# Patient Record
Sex: FEMALE | Race: WHITE | NOT HISPANIC OR LATINO
[De-identification: names, ages, dates, MRNs, and addresses within clinical notes are randomized per-mention and may not be internally consistent; named-entity substitution may affect disease eponyms.]

---

## 2018-02-06 ENCOUNTER — APPOINTMENT (OUTPATIENT)
Dept: FAMILY MEDICINE | Facility: CLINIC | Age: 58
End: 2018-02-06
Payer: COMMERCIAL

## 2018-02-06 VITALS
HEIGHT: 64.5 IN | DIASTOLIC BLOOD PRESSURE: 60 MMHG | WEIGHT: 138 LBS | BODY MASS INDEX: 23.27 KG/M2 | OXYGEN SATURATION: 96 % | SYSTOLIC BLOOD PRESSURE: 98 MMHG | TEMPERATURE: 98.2 F | HEART RATE: 67 BPM

## 2018-02-06 DIAGNOSIS — J00 ACUTE NASOPHARYNGITIS [COMMON COLD]: ICD-10-CM

## 2018-02-06 PROCEDURE — 99214 OFFICE O/P EST MOD 30 MIN: CPT

## 2018-05-08 ENCOUNTER — APPOINTMENT (OUTPATIENT)
Dept: FAMILY MEDICINE | Facility: CLINIC | Age: 58
End: 2018-05-08
Payer: COMMERCIAL

## 2018-05-08 VITALS
HEIGHT: 64.5 IN | HEART RATE: 70 BPM | SYSTOLIC BLOOD PRESSURE: 94 MMHG | DIASTOLIC BLOOD PRESSURE: 62 MMHG | TEMPERATURE: 97.8 F | OXYGEN SATURATION: 97 % | WEIGHT: 134 LBS | BODY MASS INDEX: 22.6 KG/M2

## 2018-05-08 DIAGNOSIS — Z87.09 PERSONAL HISTORY OF OTHER DISEASES OF THE RESPIRATORY SYSTEM: ICD-10-CM

## 2018-05-08 DIAGNOSIS — J06.9 ACUTE UPPER RESPIRATORY INFECTION, UNSPECIFIED: ICD-10-CM

## 2018-05-08 PROCEDURE — 99214 OFFICE O/P EST MOD 30 MIN: CPT

## 2018-05-08 RX ORDER — AZITHROMYCIN 250 MG/1
250 TABLET, FILM COATED ORAL
Qty: 6 | Refills: 0 | Status: COMPLETED | COMMUNITY
Start: 2018-02-06 | End: 2018-02-11

## 2018-07-16 ENCOUNTER — RX RENEWAL (OUTPATIENT)
Age: 58
End: 2018-07-16

## 2019-03-14 ENCOUNTER — CLINICAL ADVICE (OUTPATIENT)
Age: 59
End: 2019-03-14

## 2019-08-06 ENCOUNTER — APPOINTMENT (OUTPATIENT)
Dept: FAMILY MEDICINE | Facility: CLINIC | Age: 59
End: 2019-08-06
Payer: COMMERCIAL

## 2019-08-06 VITALS
OXYGEN SATURATION: 98 % | WEIGHT: 139 LBS | BODY MASS INDEX: 23.16 KG/M2 | DIASTOLIC BLOOD PRESSURE: 82 MMHG | TEMPERATURE: 98.3 F | HEART RATE: 61 BPM | HEIGHT: 65 IN | SYSTOLIC BLOOD PRESSURE: 122 MMHG

## 2019-08-06 DIAGNOSIS — D12.6 BENIGN NEOPLASM OF COLON, UNSPECIFIED: ICD-10-CM

## 2019-08-06 DIAGNOSIS — R63.5 ABNORMAL WEIGHT GAIN: ICD-10-CM

## 2019-08-06 DIAGNOSIS — R35.0 FREQUENCY OF MICTURITION: ICD-10-CM

## 2019-08-06 DIAGNOSIS — R15.9 FULL INCONTINENCE OF FECES: ICD-10-CM

## 2019-08-06 PROCEDURE — G0444 DEPRESSION SCREEN ANNUAL: CPT

## 2019-08-06 PROCEDURE — 99214 OFFICE O/P EST MOD 30 MIN: CPT | Mod: 25

## 2019-08-06 RX ORDER — AZITHROMYCIN DIHYDRATE 250 MG/1
250 TABLET, FILM COATED ORAL
Qty: 1 | Refills: 0 | Status: COMPLETED | COMMUNITY
Start: 2018-05-08 | End: 2019-08-06

## 2019-08-06 NOTE — HEALTH RISK ASSESSMENT
[] : No [1 or 2 (0 pts)] : 1 or 2 (0 points) [Never (0 pts)] : Never (0 points) [No] : In the past 12 months have you used drugs other than those required for medical reasons? No [No falls in past year] : Patient reported no falls in the past year [0] : 2) Feeling down, depressed, or hopeless: Not at all (0) [Audit-CScore] : 0 [de-identified] : walking  [de-identified] : healthy  [[id number]] : 0

## 2019-08-06 NOTE — PHYSICAL EXAM
[No Varicosities] : no varicosities [Normal] : normal rate, regular rhythm, normal S1 and S2 and no murmur heard [No Edema] : there was no peripheral edema [Non Tender] : non-tender [Soft] : abdomen soft [Non-distended] : non-distended [No HSM] : no HSM [Normal Bowel Sounds] : normal bowel sounds [Normal Sphincter Tone] : normal sphincter tone [No Mass] : no mass [Normal Supraclavicular Nodes] : no supraclavicular lymphadenopathy [Normal Axillary Nodes] : no axillary lymphadenopathy [Normal Posterior Cervical Nodes] : no posterior cervical lymphadenopathy [Normal Anterior Cervical Nodes] : no anterior cervical lymphadenopathy [Normal Inguinal Nodes] : no inguinal lymphadenopathy [FreeTextEntry1] : brawn stool +

## 2019-08-06 NOTE — HISTORY OF PRESENT ILLNESS
[FreeTextEntry8] : cc: stool problem , weight gain \par Patient came c/o problem with her stool - sometimes when she urinates she find small amount of stool ( regural ) , no all the time , but recently more often, last Colonoscopy  2016 Diverticulosis. . She did not see GYN, due to Dexa. Needs fating blood work, She deneis Abd pain, no N,V,C,,no D , no CP, no fever.

## 2019-08-06 NOTE — PHYSICAL EXAM
[Normal] : normal rate, regular rhythm, normal S1 and S2 and no murmur heard [No Varicosities] : no varicosities [No Edema] : there was no peripheral edema [Soft] : abdomen soft [Non Tender] : non-tender [Non-distended] : non-distended [No HSM] : no HSM [Normal Bowel Sounds] : normal bowel sounds [Normal Sphincter Tone] : normal sphincter tone [No Mass] : no mass [Normal Supraclavicular Nodes] : no supraclavicular lymphadenopathy [Normal Axillary Nodes] : no axillary lymphadenopathy [Normal Posterior Cervical Nodes] : no posterior cervical lymphadenopathy [Normal Anterior Cervical Nodes] : no anterior cervical lymphadenopathy [Normal Inguinal Nodes] : no inguinal lymphadenopathy [FreeTextEntry1] : brawn stool +

## 2019-08-06 NOTE — COUNSELING
[Good understanding] : Patient has a good understanding of lifestyle changes and steps needed to achieve self management goal [Fall prevention counseling provided] : Fall prevention counseling provided

## 2019-08-06 NOTE — HEALTH RISK ASSESSMENT
[] : No [1 or 2 (0 pts)] : 1 or 2 (0 points) [Never (0 pts)] : Never (0 points) [No] : In the past 12 months have you used drugs other than those required for medical reasons? No [No falls in past year] : Patient reported no falls in the past year [0] : 2) Feeling down, depressed, or hopeless: Not at all (0) [Audit-CScore] : 0 [de-identified] : healthy  [de-identified] : walking  [[id number]] : 0

## 2019-08-06 NOTE — REVIEW OF SYSTEMS
[Abdominal Pain] : no abdominal pain [Nausea] : no nausea [Constipation] : no constipation [Diarrhea] : diarrhea [Vomiting] : no vomiting [Heartburn] : no heartburn [Melena] : no melena [Dysuria] : no dysuria [Incontinence] : incontinence [Nocturia] : no nocturia [Hematuria] : no hematuria [Frequency] : frequency [Negative] : Respiratory [FreeTextEntry7] : defecation during urination PRn

## 2019-08-08 LAB
25(OH)D3 SERPL-MCNC: 24.6 NG/ML
ALBUMIN SERPL ELPH-MCNC: 4.4 G/DL
ALP BLD-CCNC: 78 U/L
ALT SERPL-CCNC: 18 U/L
ANION GAP SERPL CALC-SCNC: 14 MMOL/L
APPEARANCE: CLEAR
AST SERPL-CCNC: 17 U/L
BACTERIA UR CULT: NORMAL
BASOPHILS # BLD AUTO: 0.03 K/UL
BASOPHILS NFR BLD AUTO: 0.6 %
BILIRUB SERPL-MCNC: 0.3 MG/DL
BILIRUBIN URINE: NEGATIVE
BLOOD URINE: NEGATIVE
BUN SERPL-MCNC: 19 MG/DL
CALCIUM SERPL-MCNC: 9.9 MG/DL
CHLORIDE SERPL-SCNC: 104 MMOL/L
CHOLEST SERPL-MCNC: 207 MG/DL
CHOLEST/HDLC SERPL: 3 RATIO
CO2 SERPL-SCNC: 24 MMOL/L
COLOR: NORMAL
CREAT SERPL-MCNC: 0.65 MG/DL
EOSINOPHIL # BLD AUTO: 0.06 K/UL
EOSINOPHIL NFR BLD AUTO: 1.1 %
ESTIMATED AVERAGE GLUCOSE: 105 MG/DL
FOLATE SERPL-MCNC: >20 NG/ML
GLUCOSE QUALITATIVE U: NEGATIVE
GLUCOSE SERPL-MCNC: 102 MG/DL
HAV IGM SER QL: NONREACTIVE
HBA1C MFR BLD HPLC: 5.3 %
HBV CORE IGM SER QL: NONREACTIVE
HBV SURFACE AG SER QL: NONREACTIVE
HCT VFR BLD CALC: 42.9 %
HCV AB SER QL: NONREACTIVE
HCV S/CO RATIO: 0.22 S/CO
HDLC SERPL-MCNC: 70 MG/DL
HGB BLD-MCNC: 13.9 G/DL
HIV1+2 AB SPEC QL IA.RAPID: NONREACTIVE
IMM GRANULOCYTES NFR BLD AUTO: 0.6 %
KETONES URINE: NEGATIVE
LDLC SERPL CALC-MCNC: 124 MG/DL
LEUKOCYTE ESTERASE URINE: NEGATIVE
LYMPHOCYTES # BLD AUTO: 1.38 K/UL
LYMPHOCYTES NFR BLD AUTO: 25.8 %
MAN DIFF?: NORMAL
MCHC RBC-ENTMCNC: 31.9 PG
MCHC RBC-ENTMCNC: 32.4 GM/DL
MCV RBC AUTO: 98.4 FL
MONOCYTES # BLD AUTO: 0.35 K/UL
MONOCYTES NFR BLD AUTO: 6.5 %
NEUTROPHILS # BLD AUTO: 3.5 K/UL
NEUTROPHILS NFR BLD AUTO: 65.4 %
NITRITE URINE: NEGATIVE
PH URINE: 7
PLATELET # BLD AUTO: 242 K/UL
POTASSIUM SERPL-SCNC: 4.5 MMOL/L
PROT SERPL-MCNC: 7 G/DL
PROTEIN URINE: NEGATIVE
RBC # BLD: 4.36 M/UL
RBC # FLD: 11.9 %
SODIUM SERPL-SCNC: 142 MMOL/L
SPECIFIC GRAVITY URINE: 1.01
TRIGL SERPL-MCNC: 64 MG/DL
TSH SERPL-ACNC: 1.6 UIU/ML
URATE SERPL-MCNC: 5.2 MG/DL
UROBILINOGEN URINE: NORMAL
VIT B12 SERPL-MCNC: 719 PG/ML
WBC # FLD AUTO: 5.35 K/UL

## 2019-09-19 ENCOUNTER — APPOINTMENT (OUTPATIENT)
Dept: OBGYN | Facility: CLINIC | Age: 59
End: 2019-09-19
Payer: COMMERCIAL

## 2019-09-19 VITALS
WEIGHT: 140 LBS | DIASTOLIC BLOOD PRESSURE: 74 MMHG | HEIGHT: 65 IN | SYSTOLIC BLOOD PRESSURE: 101 MMHG | BODY MASS INDEX: 23.32 KG/M2

## 2019-09-19 DIAGNOSIS — Z13.820 ENCOUNTER FOR SCREENING FOR OSTEOPOROSIS: ICD-10-CM

## 2019-09-19 DIAGNOSIS — R92.2 INCONCLUSIVE MAMMOGRAM: ICD-10-CM

## 2019-09-19 DIAGNOSIS — M62.89 OTHER SPECIFIED DISORDERS OF MUSCLE: ICD-10-CM

## 2019-09-19 DIAGNOSIS — Z01.419 ENCOUNTER FOR GYNECOLOGICAL EXAMINATION (GENERAL) (ROUTINE) W/OUT ABNORMAL FINDINGS: ICD-10-CM

## 2019-09-19 DIAGNOSIS — Z12.39 ENCOUNTER FOR OTHER SCREENING FOR MALIGNANT NEOPLASM OF BREAST: ICD-10-CM

## 2019-09-19 PROCEDURE — 99386 PREV VISIT NEW AGE 40-64: CPT

## 2019-09-19 NOTE — COUNSELING
[Breast Self Exam] : breast self exam [Nutrition] : nutrition [Exercise] : exercise [Vitamins/Supplements] : vitamins/supplements [Bladder Hygiene] : bladder hygiene [Vulvar Hygiene] : vulvar hygiene [Body Image] : body image

## 2019-09-19 NOTE — CHIEF COMPLAINT
[Annual Visit] : annual visit [FreeTextEntry1] : mammo 2018,colon 2016\par dysp ,vag dryness--rec [facility] and PT--pt frightened to do pap\par [facility]

## 2019-09-19 NOTE — PHYSICAL EXAM
[Awake] : awake [Alert] : alert [Soft] : soft [Oriented x3] : oriented to person, place, and time [Normal] : uterus [No Bleeding] : there was no active vaginal bleeding [Uterine Adnexae] : were not tender and not enlarged [Acute Distress] : no acute distress [Mass] : no breast mass [Nipple Discharge] : no nipple discharge [Axillary LAD] : no axillary lymphadenopathy [Tender] : non tender [Atrophy] : atrophy [FreeTextEntry4] : levator muscle spasm

## 2019-09-20 LAB — HPV HIGH+LOW RISK DNA PNL CVX: NOT DETECTED

## 2019-09-24 LAB — CYTOLOGY CVX/VAG DOC THIN PREP: ABNORMAL

## 2019-10-02 ENCOUNTER — APPOINTMENT (OUTPATIENT)
Dept: GASTROENTEROLOGY | Facility: CLINIC | Age: 59
End: 2019-10-02
Payer: COMMERCIAL

## 2019-10-02 VITALS
WEIGHT: 142 LBS | OXYGEN SATURATION: 98 % | HEIGHT: 65 IN | RESPIRATION RATE: 15 BRPM | HEART RATE: 67 BPM | DIASTOLIC BLOOD PRESSURE: 68 MMHG | BODY MASS INDEX: 23.66 KG/M2 | SYSTOLIC BLOOD PRESSURE: 118 MMHG

## 2019-10-02 DIAGNOSIS — K52.9 NONINFECTIVE GASTROENTERITIS AND COLITIS, UNSPECIFIED: ICD-10-CM

## 2019-10-02 PROCEDURE — 99203 OFFICE O/P NEW LOW 30 MIN: CPT

## 2019-10-02 NOTE — PHYSICAL EXAM
[General Appearance - Alert] : alert [General Appearance - In No Acute Distress] : in no acute distress [Outer Ear] : the ears and nose were normal in appearance [Sclera] : the sclera and conjunctiva were normal [Auscultation Breath Sounds / Voice Sounds] : lungs were clear to auscultation bilaterally [Heart Rate And Rhythm] : heart rate was normal and rhythm regular [Heart Sounds] : normal S1 and S2 [Heart Sounds Gallop] : no gallops [Murmurs] : no murmurs [Heart Sounds Pericardial Friction Rub] : no pericardial rub [Edema] : there was no peripheral edema [Bowel Sounds] : normal bowel sounds [Abdomen Soft] : soft [Abdomen Tenderness] : non-tender [] : no hepato-splenomegaly [Abdomen Mass (___ Cm)] : no abdominal mass palpated [Normal Sphincter Tone] : normal sphincter tone [No Rectal Mass] : no rectal mass [External Hemorrhoid] : external hemorrhoids [FreeTextEntry1] : small tinge of yellow stool in vault [Skin Color & Pigmentation] : normal skin color and pigmentation [No Focal Deficits] : no focal deficits

## 2019-10-02 NOTE — HISTORY OF PRESENT ILLNESS
[FreeTextEntry1] : This is a 59-year-old female who is a living liver donator to her  presenting with symptoms of having bowel movements with urination. She states that for the past few months she has had more frequent urination and at times with urinary incontinence. She has noticed that she has a small amount of bowel movement with each urination. She does have some urge to have bowel movement just prior to having a bowel movement. The stool is small, soft and formed. She denies associated rectal bleeding or melena. She denies fecal incontinence. She denies weight loss or history of anemia. She denies family history of colon cancer. She underwent a full colonoscopy 2016 with diverticulosis and a long redundant tortuous colon. I explained this to her at length. She also complains of back pain. She reports history of chronic back pain with sciatica. She admits to drinking 20 ounce coffee in the morning with milk. She is reluctant to give this up.  She is on a special diet to lose weight. Part of the diet is using milk to make ice cream. She also has cheese products and yogurt.

## 2019-10-02 NOTE — ASSESSMENT
[FreeTextEntry1] : This is a 59-year-old female presenting with bowel movements with urination. She denies fecal incontinence. She does report more frequent urination and a urinary incontinence. She may have pelvic floor dysfunction. I recommend consultation with urogynecology. I gave her contact information. I recommend 2 week trial on a lactose-free diet. I recommend cutting down on the intake of caffeinated beverages including having smaller amount of coffee in the morning with lactose-free milk. If she continues to have GI symptoms despite above measures, she is to give me a call then consideration be given to repeating a colonoscopy. She is concerned that the back pain is a sign of colorectal cancer. However she does have a history of chronic back pain with sciatica.

## 2019-12-02 ENCOUNTER — OTHER (OUTPATIENT)
Age: 59
End: 2019-12-02

## 2019-12-03 ENCOUNTER — OTHER (OUTPATIENT)
Age: 59
End: 2019-12-03

## 2020-12-16 PROBLEM — J06.9 URI, ACUTE: Status: RESOLVED | Noted: 2018-05-08 | Resolved: 2020-12-16

## 2020-12-21 ENCOUNTER — APPOINTMENT (OUTPATIENT)
Dept: GASTROENTEROLOGY | Facility: CLINIC | Age: 60
End: 2020-12-21
Payer: COMMERCIAL

## 2020-12-21 VITALS
TEMPERATURE: 98.9 F | HEART RATE: 70 BPM | BODY MASS INDEX: 23.32 KG/M2 | DIASTOLIC BLOOD PRESSURE: 80 MMHG | WEIGHT: 140 LBS | OXYGEN SATURATION: 99 % | SYSTOLIC BLOOD PRESSURE: 120 MMHG | HEIGHT: 65 IN

## 2020-12-21 DIAGNOSIS — Z12.11 ENCOUNTER FOR SCREENING FOR MALIGNANT NEOPLASM OF COLON: ICD-10-CM

## 2020-12-21 PROBLEM — Z01.419 ENCOUNTER FOR ANNUAL ROUTINE GYNECOLOGICAL EXAMINATION: Status: RESOLVED | Noted: 2019-09-18 | Resolved: 2020-12-21

## 2020-12-21 PROCEDURE — 99213 OFFICE O/P EST LOW 20 MIN: CPT

## 2020-12-21 PROCEDURE — 99072 ADDL SUPL MATRL&STAF TM PHE: CPT

## 2020-12-21 NOTE — HISTORY OF PRESENT ILLNESS
[FreeTextEntry1] : Dee Dee presents for follow-up visit.  She continues to be concerned about colon cancer and has been researching the Internet.  She denies abdominal pain, nausea or vomiting.  She denies changes in bowel habits.  She continues to have low back pain on an intermittent basis.  She continues to have bowel movements when she has urination.  She did not consult with a urologist.  She denies rectal bleeding.  She denies weight loss.  She is concerned about covid infection and states that even if she needed a colonoscopy she prefers to hold off until the end of the pandemic.  She denies family history of colon cancer or colon polyps.  Colonoscopy from 2016 with good colonic preparation, diverticulosis, otherwise no polyps or abnormal mucosa.  I again explained to her the results of the above.

## 2020-12-21 NOTE — ASSESSMENT
[FreeTextEntry1] : This is a 60-year-old female with most probable pelvic floor dysfunction presenting for possible screening colonoscopy.  I explained to her normal colonoscopy from 2016.  She denies family history of colon cancer or colon polyps.  She is very concerned about having colorectal cancer.  I explained to her guidelines on colon cancer screening.  I explained to her alternatives to colonoscopy given her concerns.  I recommend fecal DNA  testing.  I explained possible false positive rate of approximately 13% and if it is positive she will require a colonoscopy.  If the Cologuard is negative she is good for another 3 years.  Multiple questions were answered.  She stated understanding.

## 2022-10-17 ENCOUNTER — NON-APPOINTMENT (OUTPATIENT)
Age: 62
End: 2022-10-17

## 2023-03-24 ENCOUNTER — NON-APPOINTMENT (OUTPATIENT)
Age: 63
End: 2023-03-24

## 2023-03-25 ENCOUNTER — APPOINTMENT (OUTPATIENT)
Dept: RADIOLOGY | Facility: HOSPITAL | Age: 63
End: 2023-03-25
Payer: COMMERCIAL

## 2023-03-25 ENCOUNTER — OUTPATIENT (OUTPATIENT)
Dept: OUTPATIENT SERVICES | Facility: HOSPITAL | Age: 63
LOS: 1 days | End: 2023-03-25
Payer: COMMERCIAL

## 2023-03-25 DIAGNOSIS — M25.571 PAIN IN RIGHT ANKLE AND JOINTS OF RIGHT FOOT: ICD-10-CM

## 2023-03-25 PROCEDURE — 73610 X-RAY EXAM OF ANKLE: CPT

## 2023-03-25 PROCEDURE — 73610 X-RAY EXAM OF ANKLE: CPT | Mod: 26,RT

## 2024-03-06 ENCOUNTER — NON-APPOINTMENT (OUTPATIENT)
Age: 64
End: 2024-03-06

## 2024-03-06 ENCOUNTER — APPOINTMENT (OUTPATIENT)
Dept: FAMILY MEDICINE | Facility: CLINIC | Age: 64
End: 2024-03-06
Payer: COMMERCIAL

## 2024-03-06 VITALS
HEIGHT: 64.5 IN | SYSTOLIC BLOOD PRESSURE: 122 MMHG | WEIGHT: 135 LBS | OXYGEN SATURATION: 100 % | HEART RATE: 61 BPM | DIASTOLIC BLOOD PRESSURE: 76 MMHG | TEMPERATURE: 97.1 F | BODY MASS INDEX: 22.77 KG/M2 | RESPIRATION RATE: 17 BRPM

## 2024-03-06 DIAGNOSIS — Z92.89 PERSONAL HISTORY OF OTHER MEDICAL TREATMENT: ICD-10-CM

## 2024-03-06 DIAGNOSIS — M81.0 AGE-RELATED OSTEOPOROSIS W/OUT CURRENT PATHOLOGICAL FRACTURE: ICD-10-CM

## 2024-03-06 DIAGNOSIS — Z00.00 ENCOUNTER FOR GENERAL ADULT MEDICAL EXAMINATION W/OUT ABNORMAL FINDINGS: ICD-10-CM

## 2024-03-06 DIAGNOSIS — Z78.0 ASYMPTOMATIC MENOPAUSAL STATE: ICD-10-CM

## 2024-03-06 PROCEDURE — 99203 OFFICE O/P NEW LOW 30 MIN: CPT

## 2024-03-06 NOTE — PHYSICAL EXAM
[No Acute Distress] : no acute distress [Well Nourished] : well nourished [Well Developed] : well developed [Well-Appearing] : well-appearing [Normal Sclera/Conjunctiva] : normal sclera/conjunctiva [PERRL] : pupils equal round and reactive to light [EOMI] : extraocular movements intact [Normal Outer Ear/Nose] : the outer ears and nose were normal in appearance [Normal Oropharynx] : the oropharynx was normal [No JVD] : no jugular venous distention [Supple] : supple [No Lymphadenopathy] : no lymphadenopathy [Thyroid Normal, No Nodules] : the thyroid was normal and there were no nodules present [No Respiratory Distress] : no respiratory distress  [No Accessory Muscle Use] : no accessory muscle use [Clear to Auscultation] : lungs were clear to auscultation bilaterally [Normal Rate] : normal rate  [Regular Rhythm] : with a regular rhythm [Normal S1, S2] : normal S1 and S2 [No Murmur] : no murmur heard [No Carotid Bruits] : no carotid bruits [No Abdominal Bruit] : a ~M bruit was not heard ~T in the abdomen [No Varicosities] : no varicosities [Pedal Pulses Present] : the pedal pulses are present [No Edema] : there was no peripheral edema [No Palpable Aorta] : no palpable aorta [No Extremity Clubbing/Cyanosis] : no extremity clubbing/cyanosis [Soft] : abdomen soft [Non-distended] : non-distended [Non Tender] : non-tender [No Masses] : no abdominal mass palpated [No HSM] : no HSM [Normal Bowel Sounds] : normal bowel sounds [Normal Posterior Cervical Nodes] : no posterior cervical lymphadenopathy [Normal Anterior Cervical Nodes] : no anterior cervical lymphadenopathy [No CVA Tenderness] : no CVA  tenderness [No Spinal Tenderness] : no spinal tenderness [No Joint Swelling] : no joint swelling [Grossly Normal Strength/Tone] : grossly normal strength/tone [No Rash] : no rash [Coordination Grossly Intact] : coordination grossly intact [No Focal Deficits] : no focal deficits [Normal Gait] : normal gait [Deep Tendon Reflexes (DTR)] : deep tendon reflexes were 2+ and symmetric [Normal Affect] : the affect was normal [Normal Insight/Judgement] : insight and judgment were intact

## 2024-03-06 NOTE — HEALTH RISK ASSESSMENT
[0] : 2) Feeling down, depressed, or hopeless: Not at all (0) [PHQ-2 Negative - No further assessment needed] : PHQ-2 Negative - No further assessment needed [Former] : Former [> 15 Years] : > 15 Years

## 2024-03-06 NOTE — HISTORY OF PRESENT ILLNESS
[de-identified] : 63[facility]  Have not been seen since 2019. Reports that she does not want physical, but is requesting a prescription for mammogram. 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 No acute concerns at visit. Reviewing previous bone density, mammogram, and colonoscopy.  Bone density was indicative of osteoporosis.  Reports that she is aware but not receptive to any medical management.  Not taking any calcium or vitamin D.  Minimal exercise reported.  Previous mammogram and colonoscopy were unremarkable and benign per reports

## 2024-03-06 NOTE — PLAN
[FreeTextEntry1] : Discussed treatment modalities for osteoporosis.  Receptive to repeating bone density and taking calcium and vitamin D.  Denies any type of medical management.  Mammogram ordered Encouraged GYN eval   Follow-up for physical-   Comprehensive blood work ordered

## 2024-03-19 ENCOUNTER — NON-APPOINTMENT (OUTPATIENT)
Age: 64
End: 2024-03-19

## 2024-04-07 ENCOUNTER — NON-APPOINTMENT (OUTPATIENT)
Age: 64
End: 2024-04-07

## 2024-07-25 ENCOUNTER — APPOINTMENT (OUTPATIENT)
Dept: ENDOCRINOLOGY | Facility: CLINIC | Age: 64
End: 2024-07-25
Payer: COMMERCIAL

## 2024-07-25 VITALS
HEART RATE: 62 BPM | SYSTOLIC BLOOD PRESSURE: 120 MMHG | BODY MASS INDEX: 21.42 KG/M2 | WEIGHT: 127 LBS | DIASTOLIC BLOOD PRESSURE: 74 MMHG | HEIGHT: 64.5 IN | OXYGEN SATURATION: 99 %

## 2024-07-25 DIAGNOSIS — M81.0 AGE-RELATED OSTEOPOROSIS W/OUT CURRENT PATHOLOGICAL FRACTURE: ICD-10-CM

## 2024-07-25 PROCEDURE — G2211 COMPLEX E/M VISIT ADD ON: CPT | Mod: NC

## 2024-07-25 PROCEDURE — 99204 OFFICE O/P NEW MOD 45 MIN: CPT

## 2024-07-25 NOTE — CONSULT LETTER
[Dear  ___] : Dear  [unfilled], [Consult Letter:] : I had the pleasure of evaluating your patient, [unfilled]. [Please see my note below.] : Please see my note below. [Consult Closing:] : Thank you very much for allowing me to participate in the care of this patient.  If you have any questions, please do not hesitate to contact me. [Sincerely,] : Sincerely, [FreeTextEntry2] : Dr. Lex Silvestre MD [FreeTextEntry3] : Dr. Nabeel Painter MD

## 2024-07-25 NOTE — REASON FOR VISIT
[Consultation] : a consultation visit [Osteoporosis] : osteoporosis [FreeTextEntry2] : Dr. Lex Silvestre MD

## 2024-07-25 NOTE — END OF VISIT
[FreeTextEntry3] :  This note was written by Elidia Quiros on (July 25, 2024) acting as a medical scribe for Dr. Painter This note was authored by the medical scribe for me. I have reviewed, edited, and revised the note as needed. I am in agreement with the exam findings, imaging findings, and treatment plan.  Nabeel Painter MD

## 2024-07-25 NOTE — ASSESSMENT
[FreeTextEntry1] : 62 y/o female is referred for initial consultation for osteoporosis.  Patient is generally in overall good health. Pt was first told in 2019. Pt was not treated w/ any osteoporosis medication at the time. Pt has a family hx of osteoporosis, no hip fractures. BMD 03/19/2024 shows comparison to 2019. Spine: -2.7, osteoporosis, prior report -2.5. Total hip: Left: -4.1, osteoporosis, prior report -2.1 (inaccurate reading). Femoral neck: -3.3, osteoporosis, prior report -2.1 (inaccurate reading).  BMD 07/25/2024 (no fee) shows: Left total hip: -2.8, osteoporosis. Left Femoral neck: -3.0, osteoporosis.   BMD results were discussed with the patient. Given the patient's history and BMD, the patient is at an increased risk of future fracture. Options of medical therapy were discussed in detail including risks and benefits.   I discussed Rx with bisphosphonate therapy, including IV Reclast. Risks and benefits of bisphosphonate therapy were discussed including minor aches and pains, heartburn, gastroesophageal irritation (excluding IV Reclast), osteonecrosis of the jaw (ONJ), atypical femur fractures, and acute phase reaction (w/ IV Reclast only). The patient would benefit from bisphosphonate therapy with the expectation of a drug holiday within 3-5 years.   I discussed Rx with twice a year Prolia injection. Risks and benefits of Prolia discussed including ONJ and atypical femur fracture. I discussed that the patient cannot stop Prolia without expecting rapid bone loss and an increase in risk for future fracture unless they transition to another Rx. Furthermore, there is 10 year safety data for Prolia.   I do not believe the patient requires or is likely to consider anabolic therapy.    All questions were answered. Rx information handout provided. The patient understands and will call regarding her decision.    I discussed that weight bearing exercises, cardiovascular activities, and diet are beneficial to overall health, but are not adequate for bone density increase or alternative to osteoporosis medication. I recommend supplementing with no more than 500 mg of Ca and 1000 IU of Vit D3.   Draw labs today including CMP, PTH, Vit D, and CTx.   Follow up TBD. Repeat BMD in 1 year after starting therapy. Soto MS, Timothy SL, Allie KL, Margarito EM, Solange KG,  AJ, Sarthak ES. The clinician's guide to prevention and treatment of osteoporosis. Osteoporos Int. 2022 Oct;33(10):2680-7888. .

## 2024-07-25 NOTE — PROCEDURE
[FreeTextEntry1] : Bone Mineral Density: 07/25/2024 (no fee) Left total hip: -2.8, osteoporosis Left Femoral neck: -3.0, osteoporosis   Bone Mineral Density: 03/19/2024 Indication: Comparison to 2019 Spine: -2.7, osteoporosis, prior report -2.5 Total hip: Left: -4.1, osteoporosis, prior report -2.1 (inaccurate reading) Femoral neck: -3.3, osteoporosis, prior report -2.1 (inaccurate reading) Proximal radius: not performed   Bone Mineral Density:  11/15/2019 Indication: initial screening  Spine: -2.5, osteoporosis Total hip: -2.1, osteopenia  Femoral neck: -2.1, osteopenia  Proximal radius: not performed

## 2024-07-25 NOTE — ASSESSMENT
[FreeTextEntry1] : 62 y/o female is referred for initial consultation for osteoporosis.  Patient is generally in overall good health. Pt was first told in 2019. Pt was not treated w/ any osteoporosis medication at the time. Pt has a family hx of osteoporosis, no hip fractures. BMD 03/19/2024 shows comparison to 2019. Spine: -2.7, osteoporosis, prior report -2.5. Total hip: Left: -4.1, osteoporosis, prior report -2.1 (inaccurate reading). Femoral neck: -3.3, osteoporosis, prior report -2.1 (inaccurate reading).  BMD 07/25/2024 (no fee) shows: Left total hip: -2.8, osteoporosis. Left Femoral neck: -3.0, osteoporosis.   BMD results were discussed with the patient. Given the patient's history and BMD, the patient is at an increased risk of future fracture. Options of medical therapy were discussed in detail including risks and benefits.   I discussed Rx with bisphosphonate therapy, including IV Reclast. Risks and benefits of bisphosphonate therapy were discussed including minor aches and pains, heartburn, gastroesophageal irritation (excluding IV Reclast), osteonecrosis of the jaw (ONJ), atypical femur fractures, and acute phase reaction (w/ IV Reclast only). The patient would benefit from bisphosphonate therapy with the expectation of a drug holiday within 3-5 years.   I discussed Rx with twice a year Prolia injection. Risks and benefits of Prolia discussed including ONJ and atypical femur fracture. I discussed that the patient cannot stop Prolia without expecting rapid bone loss and an increase in risk for future fracture unless they transition to another Rx. Furthermore, there is 10 year safety data for Prolia.   I do not believe the patient requires or is likely to consider anabolic therapy.    All questions were answered. Rx information handout provided. The patient understands and will call regarding her decision.    I discussed that weight bearing exercises, cardiovascular activities, and diet are beneficial to overall health, but are not adequate for bone density increase or alternative to osteoporosis medication. I recommend supplementing with no more than 500 mg of Ca and 1000 IU of Vit D3.   Draw labs today including CMP, PTH, Vit D, and CTx.   Follow up TBD. Repeat BMD in 1 year after starting therapy. Soto MS, Timothy SL, Allie KL, Margarito EM, Solange KG,  AJ, Sarthak ES. The clinician's guide to prevention and treatment of osteoporosis. Osteoporos Int. 2022 Oct;33(10):6934-0073. .

## 2024-07-25 NOTE — HISTORY OF PRESENT ILLNESS
[FreeTextEntry1] : Patient is generally in overall good health. Pt was first told in 2019. Pt was not treated w/ any osteoporosis medication at the time. Pt has a family hx of osteoporosis, no hip fractures.   Bone Mineral Density: 03/19/2024 Indication: Comparison to 2019 Spine: -2.7, osteoporosis, prior report -2.5 Total hip: Left: -4.1, osteoporosis, prior report -2.1 (inaccurate reading) Femoral neck: -3.3, osteoporosis, prior report -2.1 (inaccurate reading) Proximal radius: not performed   Bone Mineral Density:  11/15/2019 Indication: initial screening  Spine: -2.5, osteoporosis Total hip: -2.1, osteopenia  Femoral neck: -2.1, osteopenia  Proximal radius: not performed   Patient is post-menopausal since age 48. No HRT.  Pt has no Hx of kidney stones or calcium issues. No Hx of anorexia nervosa, premature menopause, amenorrhea during reproductive years. No h/o celiac disease, malabsorption, nutritional deficiencies. No ulcers or bleeding. No other unusual risks for osteoporosis such as FHx hip fracture, suggestion of OI. No Hx of RT. No chronic prednisone use. No Hx of Paget's disease. No Hx of DVT or PE. Denies EtOH use, excessive soda intake, unusual dietary restrictions. Up to date with routine care.   Former smoker, quit 35 years ago  PMHx: none PSHx: Liver donor  Sees DDS every 6 months. No major dental work planned.

## 2024-07-26 LAB
25(OH)D3 SERPL-MCNC: 25.5 NG/ML
ALBUMIN SERPL ELPH-MCNC: 4.4 G/DL
ALP BLD-CCNC: 83 U/L
ALT SERPL-CCNC: 18 U/L
ANION GAP SERPL CALC-SCNC: 12 MMOL/L
AST SERPL-CCNC: 20 U/L
BILIRUB SERPL-MCNC: 0.2 MG/DL
BUN SERPL-MCNC: 19 MG/DL
CALCIUM SERPL-MCNC: 9.7 MG/DL
CALCIUM SERPL-MCNC: 9.7 MG/DL
CHLORIDE SERPL-SCNC: 108 MMOL/L
CO2 SERPL-SCNC: 27 MMOL/L
CREAT SERPL-MCNC: 0.71 MG/DL
EGFR: 94 ML/MIN/1.73M2
GLUCOSE SERPL-MCNC: 85 MG/DL
HCT VFR BLD CALC: 41.8 %
HGB BLD-MCNC: 13.7 G/DL
MCHC RBC-ENTMCNC: 31.9 PG
MCHC RBC-ENTMCNC: 32.8 GM/DL
MCV RBC AUTO: 97.2 FL
PARATHYROID HORMONE INTACT: 41 PG/ML
PLATELET # BLD AUTO: 271 K/UL
POTASSIUM SERPL-SCNC: 4.6 MMOL/L
PROT SERPL-MCNC: 6.5 G/DL
RBC # BLD: 4.3 M/UL
RBC # FLD: 12.9 %
SODIUM SERPL-SCNC: 147 MMOL/L
T4 FREE SERPL-MCNC: 1.1 NG/DL
TSH SERPL-ACNC: 1.83 UIU/ML
WBC # FLD AUTO: 7.45 K/UL

## 2025-05-06 ENCOUNTER — APPOINTMENT (OUTPATIENT)
Dept: FAMILY MEDICINE | Facility: CLINIC | Age: 65
End: 2025-05-06

## 2025-06-05 ENCOUNTER — NON-APPOINTMENT (OUTPATIENT)
Age: 65
End: 2025-06-05

## 2025-06-05 ENCOUNTER — APPOINTMENT (OUTPATIENT)
Dept: OBGYN | Facility: CLINIC | Age: 65
End: 2025-06-05
Payer: COMMERCIAL

## 2025-06-05 VITALS
BODY MASS INDEX: 22.26 KG/M2 | HEART RATE: 71 BPM | HEIGHT: 64.5 IN | DIASTOLIC BLOOD PRESSURE: 74 MMHG | WEIGHT: 132 LBS | SYSTOLIC BLOOD PRESSURE: 117 MMHG

## 2025-06-05 DIAGNOSIS — Z78.9 OTHER SPECIFIED HEALTH STATUS: ICD-10-CM

## 2025-06-05 DIAGNOSIS — Z01.419 ENCOUNTER FOR GYNECOLOGICAL EXAMINATION (GENERAL) (ROUTINE) W/OUT ABNORMAL FINDINGS: ICD-10-CM

## 2025-06-05 PROCEDURE — G0101: CPT

## 2025-06-08 LAB — HPV HIGH+LOW RISK DNA PNL CVX: NOT DETECTED

## 2025-06-09 LAB — CYTOLOGY CVX/VAG DOC THIN PREP: ABNORMAL

## 2025-06-10 ENCOUNTER — NON-APPOINTMENT (OUTPATIENT)
Age: 65
End: 2025-06-10